# Patient Record
Sex: MALE | Race: WHITE | NOT HISPANIC OR LATINO | Employment: OTHER | URBAN - METROPOLITAN AREA
[De-identification: names, ages, dates, MRNs, and addresses within clinical notes are randomized per-mention and may not be internally consistent; named-entity substitution may affect disease eponyms.]

---

## 2017-03-29 ENCOUNTER — OFFICE VISIT (OUTPATIENT)
Dept: CARDIOLOGY | Facility: CLINIC | Age: 67
End: 2017-03-29

## 2017-03-29 VITALS
WEIGHT: 209 LBS | SYSTOLIC BLOOD PRESSURE: 140 MMHG | BODY MASS INDEX: 28.31 KG/M2 | DIASTOLIC BLOOD PRESSURE: 70 MMHG | HEIGHT: 72 IN | HEART RATE: 71 BPM

## 2017-03-29 DIAGNOSIS — I10 ESSENTIAL HYPERTENSION: ICD-10-CM

## 2017-03-29 DIAGNOSIS — I48.3 TYPICAL ATRIAL FLUTTER (HCC): Primary | ICD-10-CM

## 2017-03-29 PROCEDURE — 93000 ELECTROCARDIOGRAM COMPLETE: CPT | Performed by: INTERNAL MEDICINE

## 2017-03-29 PROCEDURE — 99213 OFFICE O/P EST LOW 20 MIN: CPT | Performed by: INTERNAL MEDICINE

## 2017-03-29 NOTE — PROGRESS NOTES
Sharif A Younger  1950  [phone]      03/29/2017    [facility] CARDIOLOGY     Richard Concepcion MD  [address] [address]    Chief Complaint   Patient presents with   • Atrial Fibrillation        PROBLEM LIST:   1. Atrial flutter:  a. CHADS VASc score = 1, on Pradaxa.  b. Initial diagnosis 2009 or 2010. Patient denies antiarrhythmic drug therapy or cardioversion.  c. EP study with radiofrequency ablation of right atrial isthmus dependent flutter and a PAC rising from the [facility] [place], 10/08/2015.   2. Possible TIA  a. [facility]:   b. DASIA: PFO and VETO  c. MRI of the head: small emboli and mild narrowing of the carotids  d. Pradaxa started  3.   HTN  3. Asthma.  4. Hyperlipidemia.  5. Basal cell carcinoma of the nose.  6. Ischemic evaluation:  a. 09/25/2014 GXT EF 54%. Normal myocardial perfusion study.      Allergies  Allergies   Allergen Reactions   • Azithromycin    • Penicillins        Current Medications    Current Outpatient Prescriptions:   •  albuterol (PROVENTIL HFA;VENTOLIN HFA) 108 (90 BASE) MCG/ACT inhaler, Inhale 2 puffs every 4 (four) hours as needed for wheezing., Disp: , Rfl:   •  budesonide-formoterol (SYMBICORT) 160-4.5 MCG/ACT inhaler, Inhale 2 puffs 2 (two) times a day., Disp: , Rfl:   •  Cetirizine HCl (ZYRTEC ALLERGY PO), Take 20 mg by mouth daily., Disp: , Rfl:   •  Cholecalciferol (VITAMIN D) 2000 UNITS tablet, Take 2,000 Units by mouth daily., Disp: , Rfl:   •  Coenzyme Q10 (CO Q 10 PO), Take 200 mg by mouth daily., Disp: , Rfl:   •  dabigatran etexilate (PRADAXA) 150 MG capsu, Take 150 mg by mouth 2 (two) times a day., Disp: , Rfl:   •  lisinopril (PRINIVIL,ZESTRIL) 5 MG tablet, Take 5 mg by mouth daily., Disp: , Rfl:   •  montelukast (SINGULAIR) 10 MG tablet, Take 10 mg by mouth every night., Disp: , Rfl:   •  Omega-3 Fatty Acids (FISH OIL) 1000 MG capsule capsule, Take 1,000 mg by mouth daily with breakfast., Disp: , Rfl:   •  Saw  "Palmetto, Serenoa repens, 450 MG capsule, Take 450 mg by mouth daily., Disp: , Rfl:   •  simvastatin (ZOCOR) 40 MG tablet, Take 40 mg by mouth every night., Disp: , Rfl:   •  vitamin B-12 (CYANOCOBALAMIN) 2500 MCG sublingual tablet tablet, Place 2,500 mcg under the tongue daily., Disp: , Rfl:     History of Present Illness   HPI    Pt presents for follow up of atrial flutter. Since we last saw the pt, pt denies any AF episodes, SOB, CP, LH, and dizziness. Denies any hospitalizations, ER visits, bleeding, or TIA/CVA symptoms. Overall feels well. BP has been doing well. He had recent labs and brings them in today. His Cr is normal.     ROS:  General:  Denies fatigue, weight gain or loss  Cardiovascular:  Denies CP, PND, syncope, near syncope, edema or palpitations.  Pulmonary:  Denies MARCH, cough, or wheezing      Vitals:    03/29/17 1345   BP: 140/70   BP Location: Left arm   Patient Position: Sitting   Pulse: 71   Weight: 209 lb (94.8 kg)   Height: 72\" (182.9 cm)     PE:  General: NAD  Neck: no JVD, no carotid bruits, no TM  Heart RRR, NL S1, S2, S4 present, no rubs, murmurs  Lungs: CTA, no wheezes, rhonchi, or rales  Abd: soft, non-tender, NL BS  Ext: No musculoskeletal deformities, no edema, cyanosis, or clubbing  Psych: normal mood and affect    Diagnostic Data:    ECG 12 Lead  Date/Time: 3/29/2017 2:30 PM  Performed by: TIGRE MACIAS  Authorized by: TIGRE MACIAS   Comparison: compared with previous ECG from 7/20/2016  Comparison to previous ECG: PAC's resolved  Rhythm: sinus rhythm  BPM: 70  Conduction: incomplete RBBB            1. Typical atrial flutter    2. Essential hypertension          Plan:  1) Atrial Flutter- no recurrences after ablation  Continue present medications.   2) Anticoagulation: CHADSvasc = 4  Continue Pradaxa  3) HTN- controlled  Wt loss, exercise, salt reduction  4) TIA- no further episodes, look for records from [place]    F/jacy Owen and us prn    Scribed for Tigre BARNEY. " MD Marvin by Bety Howell PA-C. 3/29/2017  1:56 PM    I, Oscar Wong MD, personally performed the services face to face as described in this documentation and as scribed by the above named individual in my presence, and it is both accurate and complete.  3/29/2017  2:27 PM

## 2017-07-18 ENCOUNTER — OFFICE VISIT (OUTPATIENT)
Dept: PULMONOLOGY | Facility: CLINIC | Age: 67
End: 2017-07-18

## 2017-07-18 VITALS
SYSTOLIC BLOOD PRESSURE: 148 MMHG | WEIGHT: 205.4 LBS | TEMPERATURE: 98.8 F | RESPIRATION RATE: 16 BRPM | BODY MASS INDEX: 29.41 KG/M2 | HEART RATE: 82 BPM | DIASTOLIC BLOOD PRESSURE: 84 MMHG | HEIGHT: 70 IN | OXYGEN SATURATION: 93 %

## 2017-07-18 DIAGNOSIS — R06.02 SOB (SHORTNESS OF BREATH): Primary | ICD-10-CM

## 2017-07-18 DIAGNOSIS — J44.1 ASTHMA EXACERBATION IN COPD (HCC): ICD-10-CM

## 2017-07-18 DIAGNOSIS — J45.901 ASTHMA EXACERBATION IN COPD (HCC): ICD-10-CM

## 2017-07-18 LAB
BASOPHILS # BLD AUTO: 0 10*3/MM3 (ref 0–0.2)
BASOPHILS NFR BLD AUTO: 0 % (ref 0–1)
DEPRECATED RDW RBC AUTO: 46.8 FL (ref 37–54)
EOSINOPHIL # BLD AUTO: 0 10*3/MM3 (ref 0–0.3)
EOSINOPHIL NFR BLD AUTO: 0 % (ref 0–3)
ERYTHROCYTE [DISTWIDTH] IN BLOOD BY AUTOMATED COUNT: 14 % (ref 11.3–14.5)
HCT VFR BLD AUTO: 42.6 % (ref 38.9–50.9)
HGB BLD-MCNC: 14 G/DL (ref 13.1–17.5)
IMM GRANULOCYTES # BLD: 0.04 10*3/MM3 (ref 0–0.03)
IMM GRANULOCYTES NFR BLD: 0.5 % (ref 0–0.6)
LYMPHOCYTES # BLD AUTO: 0.86 10*3/MM3 (ref 0.6–4.8)
LYMPHOCYTES NFR BLD AUTO: 11.2 % (ref 24–44)
MCH RBC QN AUTO: 30 PG (ref 27–31)
MCHC RBC AUTO-ENTMCNC: 32.9 G/DL (ref 32–36)
MCV RBC AUTO: 91.4 FL (ref 80–99)
MONOCYTES # BLD AUTO: 0.23 10*3/MM3 (ref 0–1)
MONOCYTES NFR BLD AUTO: 3 % (ref 0–12)
NEUTROPHILS # BLD AUTO: 6.56 10*3/MM3 (ref 1.5–8.3)
NEUTROPHILS NFR BLD AUTO: 85.3 % (ref 41–71)
PLATELET # BLD AUTO: 202 10*3/MM3 (ref 150–450)
PMV BLD AUTO: 12.2 FL (ref 6–12)
RBC # BLD AUTO: 4.66 10*6/MM3 (ref 4.2–5.76)
WBC NRBC COR # BLD: 7.69 10*3/MM3 (ref 3.5–10.8)

## 2017-07-18 PROCEDURE — 86606 ASPERGILLUS ANTIBODY: CPT | Performed by: NURSE PRACTITIONER

## 2017-07-18 PROCEDURE — 86671 FUNGUS NES ANTIBODY: CPT | Performed by: NURSE PRACTITIONER

## 2017-07-18 PROCEDURE — 82785 ASSAY OF IGE: CPT | Performed by: NURSE PRACTITIONER

## 2017-07-18 PROCEDURE — 86612 BLASTOMYCES ANTIBODY: CPT | Performed by: NURSE PRACTITIONER

## 2017-07-18 PROCEDURE — 71020 CHG CHEST X-RAY 2 VW: CPT | Performed by: NURSE PRACTITIONER

## 2017-07-18 PROCEDURE — 94726 PLETHYSMOGRAPHY LUNG VOLUMES: CPT | Performed by: NURSE PRACTITIONER

## 2017-07-18 PROCEDURE — 86602 ANTINOMYCES ANTIBODY: CPT | Performed by: NURSE PRACTITIONER

## 2017-07-18 PROCEDURE — 99204 OFFICE O/P NEW MOD 45 MIN: CPT | Performed by: NURSE PRACTITIONER

## 2017-07-18 PROCEDURE — 86698 HISTOPLASMA ANTIBODY: CPT | Performed by: NURSE PRACTITIONER

## 2017-07-18 PROCEDURE — 82787 IGG 1 2 3 OR 4 EACH: CPT | Performed by: NURSE PRACTITIONER

## 2017-07-18 PROCEDURE — 36415 COLL VENOUS BLD VENIPUNCTURE: CPT | Performed by: NURSE PRACTITIONER

## 2017-07-18 PROCEDURE — 86631 CHLAMYDIA ANTIBODY: CPT | Performed by: NURSE PRACTITIONER

## 2017-07-18 PROCEDURE — 94729 DIFFUSING CAPACITY: CPT | Performed by: NURSE PRACTITIONER

## 2017-07-18 PROCEDURE — 86635 COCCIDIOIDES ANTIBODY: CPT | Performed by: NURSE PRACTITIONER

## 2017-07-18 PROCEDURE — 85025 COMPLETE CBC W/AUTO DIFF WBC: CPT | Performed by: NURSE PRACTITIONER

## 2017-07-18 PROCEDURE — 94060 EVALUATION OF WHEEZING: CPT | Performed by: NURSE PRACTITIONER

## 2017-07-18 PROCEDURE — 86609 BACTERIUM ANTIBODY: CPT | Performed by: NURSE PRACTITIONER

## 2017-07-18 RX ORDER — FLUTICASONE PROPIONATE 50 MCG
2 SPRAY, SUSPENSION (ML) NASAL DAILY
COMMUNITY

## 2017-07-18 RX ORDER — CEPHALEXIN 500 MG/1
CAPSULE ORAL
Qty: 42 CAPSULE | Refills: 0 | Status: SHIPPED | OUTPATIENT
Start: 2017-07-18 | End: 2018-04-17

## 2017-07-18 NOTE — PROGRESS NOTES
[facility] new patient chronic bronchitis    CHIEF COMPLAINT    Chronic bronchitis    HISTORY OF PRESENT ILLNESS    Sharif A Younger is a 67 y.o.male here as a referral for his history of bronchitis. He has a history of cough with chronic sputum production that has been worse over the past few months. He had developed a significant cough about a month ago; and was given bactrim and prednisone. The cough is better now; not as severe, but he still has a lot of thick tan sputum.  At the time he was pretty short of breath but that has improved, he has less wheezing than he had before.    He denies a history of hemoptysis, he remains on Pradaxa for his history of atrial flutter.  He denies epistaxis.  He follows along with cardiology and saw Dr. Wong back in March of this year.  He denies palpitations chest pains or edema.    He does have a lot of nasal drainage.  He is on Flonase, Singulair, Zyrtec for this.    He has no fevers chills or night sweats and his weight is been stable.    He has been on Symbicort 160, 2 puffs twice a day.  He is compliant with this.  He also has an albuterol nebulizer machine at home that he uses occasionally.    He reports a history of asthma which in the past was controlled on inhalers but he doesn't really feel like he is very well controlled lately, as every time he has the sinus drainage seems to have trouble getting over the bronchitis.  He denies much in way of dyspnea unless he is actively wheezing or sick, he doesn't have any pleuritic type chest pains.        Patient Active Problem List   Diagnosis   • Atrial flutter   • Asthma   • Hyperlipidemia   • Essential hypertension       Allergies   Allergen Reactions   • Azithromycin    • Penicillins        Current Outpatient Prescriptions:   •  albuterol (PROVENTIL HFA;VENTOLIN HFA) 108 (90 BASE) MCG/ACT inhaler, Inhale 2 puffs every 4 (four) hours as needed for wheezing., Disp: , Rfl:   •  budesonide-formoterol (SYMBICORT)  160-4.5 MCG/ACT inhaler, Inhale 2 puffs 2 (two) times a day., Disp: , Rfl:   •  Cetirizine HCl (ZYRTEC ALLERGY PO), Take 20 mg by mouth daily., Disp: , Rfl:   •  Cholecalciferol (VITAMIN D) 2000 UNITS tablet, Take 2,000 Units by mouth daily., Disp: , Rfl:   •  Coenzyme Q10 (CO Q 10 PO), Take 200 mg by mouth daily., Disp: , Rfl:   •  dabigatran etexilate (PRADAXA) 150 MG capsu, Take 150 mg by mouth 2 (two) times a day., Disp: , Rfl:   •  Flaxseed, Linseed, (FLAXSEED OIL PO), Take 1,000 mg by mouth Daily., Disp: , Rfl:   •  fluticasone (FLONASE) 50 MCG/ACT nasal spray, 2 sprays into each nostril Daily., Disp: , Rfl:   •  lisinopril (PRINIVIL,ZESTRIL) 5 MG tablet, Take 5 mg by mouth daily., Disp: , Rfl:   •  montelukast (SINGULAIR) 10 MG tablet, Take 10 mg by mouth every night., Disp: , Rfl:   •  Omega-3 Fatty Acids (FISH OIL) 1000 MG capsule capsule, Take 1,000 mg by mouth daily with breakfast., Disp: , Rfl:   •  Saw Palmetto, Serenoa repens, 450 MG capsule, Take 450 mg by mouth daily., Disp: , Rfl:   •  simvastatin (ZOCOR) 40 MG tablet, Take 40 mg by mouth every night., Disp: , Rfl:   •  vitamin B-12 (CYANOCOBALAMIN) 2500 MCG sublingual tablet tablet, Place 2,500 mcg under the tongue daily., Disp: , Rfl:   MEDICATION LIST AND ALLERGIES REVIEWED.    Social History   Substance Use Topics   • Smoking status: Former Smoker     Types: Cigarettes     Quit date: 7/20/1943   • Smokeless tobacco: Not on file   • Alcohol use 1.2 - 4.8 oz/week     1 - 4 Glasses of wine, 1 - 4 Cans of beer per week      Comment: occas       FAMILY AND SOCIAL HISTORY REVIEWED.    Review of Systems   Constitutional: Negative for chills, fatigue and fever.   HENT: Positive for congestion and postnasal drip. Negative for nosebleeds and sore throat.    Eyes: Negative.  Negative for pain and redness.   Respiratory: Positive for cough. Negative for apnea, choking, chest tightness, wheezing and stridor.    Cardiovascular: Negative for chest pain,  "palpitations and leg swelling.   Gastrointestinal: Negative for abdominal distention, abdominal pain and nausea.   Endocrine: Negative for cold intolerance and heat intolerance.   Genitourinary: Negative for dysuria, flank pain and hematuria.   Musculoskeletal: Negative for arthralgias and myalgias.   Skin: Negative for color change and rash.   Neurological: Negative for dizziness, syncope and numbness.   Hematological: Negative for adenopathy. Does not bruise/bleed easily.   Psychiatric/Behavioral: Negative for agitation, behavioral problems, confusion and sleep disturbance.   .    /84  Pulse 82  Temp 98.8 °F (37.1 °C)  Resp 16  Ht 70\" (177.8 cm)  Wt 205 lb 6.4 oz (93.2 kg)  SpO2 93%  BMI 29.47 kg/m2  Physical Exam   Constitutional: He is oriented to person, place, and time. Vital signs are normal. He appears well-developed. He is cooperative.  Non-toxic appearance. No distress.   HENT:   Head: Normocephalic. Head is without abrasion and without contusion.   Mouth/Throat: Oropharynx is clear and moist and mucous membranes are normal.   Eyes: Conjunctivae are normal. Pupils are equal, round, and reactive to light.   Neck: Trachea normal and normal range of motion. No JVD present. No tracheal deviation present. No thyroid mass and no thyromegaly present.   Cardiovascular: Normal rate, regular rhythm and normal heart sounds.  Exam reveals no gallop.    No murmur heard.  No edema; cyanosis or calf tenderness. No venous cords.    Pulmonary/Chest: Effort normal. No stridor. No respiratory distress. He has no wheezes. He has no rales.   Abdominal: Soft. He exhibits no ascites. There is no hepatosplenomegaly. There is no tenderness.   Lymphadenopathy:        Head (right side): No submandibular adenopathy present.        Head (left side): No submandibular adenopathy present.     He has no cervical adenopathy.        Right: No supraclavicular adenopathy present.        Left: No supraclavicular adenopathy " present.   Neurological: He is alert and oriented to person, place, and time. He has normal strength.   Skin: Skin is warm and dry. No abrasion and no rash noted.   Psychiatric: He has a normal mood and affect. His speech is normal and behavior is normal. Cognition and memory are normal.       RESULTS    Chest x-ray PA and lateral obtained in the office today, he appears to have somewhat prominent interstitial markings, especially in the bases, cardiomegaly with the left heart border extending most of the chest wall.  No infiltrates or effusions.    PROBLEM LIST    Asthma  --with frequent exacerbations    Sinusitis    Atrial flutter  --Chronically anticoagulated        DISCUSSION    Stop the Bactrim    Instead take the cephalexin 1 pill twice a day with food for 3 weeks, stop if she developed diarrhea    Take 40 mg of prednisone for a total of 4 days, then remain on 20 mg of prednisone for the next 2 weeks    Try to add Spiriva to  Symbicort, use 2 puffs daily, you can use any time of day.     Going to do a CT scan of the chest look for bronchiectasis     He will follow-up in a few weeks after the CT scan of the chest, call us [phone] [id number] if he have any trouble prior to that time.    Clarissa Laird, APRN  07/18/20172:22 PM  Electronically signed     Please note that portions of this note were completed with a voice recognition program. Efforts were made to edit the dictations, but occasionally words are mistranscribed.      CC: Richard Concepcion MD

## 2017-07-24 PROBLEM — J44.1 ASTHMA EXACERBATION IN COPD (HCC): Status: ACTIVE | Noted: 2017-07-24

## 2017-07-24 PROBLEM — J45.901 ASTHMA EXACERBATION IN COPD (HCC): Status: ACTIVE | Noted: 2017-07-24

## 2017-07-24 PROBLEM — R06.02 SOB (SHORTNESS OF BREATH): Status: ACTIVE | Noted: 2017-07-24

## 2017-07-24 LAB
A FLAVUS AB SER QL ID: NEGATIVE
A FUMIGATUS AB SER QL ID: NEGATIVE
A NIGER AB SER QL ID: NEGATIVE
B DERMAT AB TITR SER: NEGATIVE {TITER}
C IMMITIS AB TITR SER ID: NORMAL {TITER}
H CAPSUL AB TITR SER ID: NEGATIVE {TITER}
IGA SERPL-MCNC: 220 MG/DL (ref 61–437)
IGM SERPL-MCNC: 91 MG/DL (ref 20–172)

## 2017-07-26 LAB
A FUMIGATUS1 AB SER QL ID: NEGATIVE
A PULLULANS AB SER QL: NEGATIVE
IGG SERPL-MCNC: 964 MG/DL (ref 700–1600)
IGG1 SER-MCNC: 523 MG/DL (ref 248–810)
IGG2 SER-MCNC: 382 MG/DL (ref 130–555)
IGG3 SER-MCNC: 40 MG/DL (ref 15–102)
IGG4 SER-MCNC: 16 MG/DL (ref 2–96)
LACEYELLA SACCHARI AB SER QL: NEGATIVE
MICROPOLYSPORA FAENI: NEGATIVE
PIGEON SERUM AB QL ID: NEGATIVE
T VULGARIS AB SER QL ID: NEGATIVE
TOTAL IGE SMQN RAST: 14 IU/ML (ref 0–100)

## 2017-08-23 ENCOUNTER — HOSPITAL ENCOUNTER (OUTPATIENT)
Dept: CT IMAGING | Facility: HOSPITAL | Age: 67
Discharge: HOME | End: 2017-08-23
Admitting: NURSE PRACTITIONER

## 2017-08-23 DIAGNOSIS — J44.1 ASTHMA EXACERBATION IN COPD (HCC): ICD-10-CM

## 2017-08-23 DIAGNOSIS — J45.901 ASTHMA EXACERBATION IN COPD (HCC): ICD-10-CM

## 2017-08-23 DIAGNOSIS — R06.02 SOB (SHORTNESS OF BREATH): ICD-10-CM

## 2017-08-23 PROCEDURE — 71250 CT THORAX DX C-: CPT

## 2017-10-04 PROBLEM — J30.2 SEASONAL ALLERGIES: Status: ACTIVE | Noted: 2017-10-04

## 2017-10-04 PROBLEM — R00.1 BRADYCARDIA: Status: ACTIVE | Noted: 2017-10-04

## 2017-10-04 PROBLEM — Z92.29 HX: ANTICOAGULATION: Status: ACTIVE | Noted: 2017-10-04

## 2017-10-04 PROBLEM — C44.311 BASAL CELL CARCINOMA OF NOSE: Status: ACTIVE | Noted: 2017-10-04

## 2017-10-10 ENCOUNTER — OFFICE VISIT (OUTPATIENT)
Dept: PULMONOLOGY | Facility: CLINIC | Age: 67
End: 2017-10-10

## 2017-10-10 VITALS
HEIGHT: 70 IN | OXYGEN SATURATION: 91 % | HEART RATE: 72 BPM | TEMPERATURE: 97.7 F | WEIGHT: 208.6 LBS | SYSTOLIC BLOOD PRESSURE: 125 MMHG | DIASTOLIC BLOOD PRESSURE: 80 MMHG | BODY MASS INDEX: 29.86 KG/M2 | RESPIRATION RATE: 16 BRPM

## 2017-10-10 DIAGNOSIS — Z92.29 HX: ANTICOAGULATION: ICD-10-CM

## 2017-10-10 DIAGNOSIS — J44.1 ASTHMA EXACERBATION IN COPD (HCC): ICD-10-CM

## 2017-10-10 DIAGNOSIS — R06.02 SOB (SHORTNESS OF BREATH): Primary | ICD-10-CM

## 2017-10-10 DIAGNOSIS — J30.2 CHRONIC SEASONAL ALLERGIC RHINITIS DUE TO OTHER ALLERGEN: ICD-10-CM

## 2017-10-10 DIAGNOSIS — J45.901 ASTHMA EXACERBATION IN COPD (HCC): ICD-10-CM

## 2017-10-10 PROCEDURE — 99214 OFFICE O/P EST MOD 30 MIN: CPT | Performed by: NURSE PRACTITIONER

## 2017-10-10 RX ORDER — DOXYCYCLINE HYCLATE 100 MG/1
100 CAPSULE ORAL 2 TIMES DAILY
Qty: 20 CAPSULE | Refills: 0 | COMMUNITY
Start: 2017-10-10 | End: 2017-10-20

## 2017-10-10 RX ORDER — PREDNISONE 20 MG/1
TABLET ORAL
Qty: 15 TABLET | Refills: 0 | Status: SHIPPED | OUTPATIENT
Start: 2017-10-10

## 2017-10-10 NOTE — PROGRESS NOTES
[facility] Pulmonary Follow up    CHIEF COMPLAINT    Follow-up asthma, moderate obstruction, nasal drainage    HISTORY OF PRESENT ILLNESS    Sharif A Younger is a 67 y.o.male here today for follow-up.  I saw him as a new patient in July for history of persistent cough and sputum.  I added Spiriva, gave him a course of prednisone and cephalexin for 3 weeks, and he is here for follow-up.    He has a history of very minimal smoking for just 5 years before he stopped completely in 1943.     When I last saw him he was status post a course of Bactrim and prednisone but had finished it still had a lot of thick tan sputum, dyspnea with wheezing and nasal drainage.  I gave him 3 weeks of Ceftin air, a prednisone taper, and he felt significantly better until last Tuesday.  At that time he was doing yard work and there was a lot of dust, he noticed an increase in wheezing and now has had some coughing with tan sputum over the past few days.  He does not however feel as short of breath as he usually does when he has an exacerbation and he is attributing this to the Spiriva.  [facility] covered Spiriva hand-held for him and he is using one puff daily.  He thinks it works just as well as the respimat.  He also remains on the Symbicort 160, 2 puffs twice a day.    He denies a history of hemoptysis, he remains on Pradaxa for his history of atrial flutter.  He denies epistaxis.  He follows along with cardiology and saw Dr. Wong back in March of this year.  He denies palpitations chest pains or edema.    He remains on Flonase, Singulair, Zyrtec, there is no epistaxis.    He has no fevers chills or night sweats and his weight is been stable.    He has been using the albuterol nebulizer machine more over the past few days and that helps him expectorate as well as helps with the wheezing and coughing.    Patient Active Problem List   Diagnosis   • Atrial flutter   • Asthma   • Hyperlipidemia   • Essential hypertension   • Asthma  exacerbation in COPD   • SOB (shortness of breath)   • Seasonal allergies   • Basal cell carcinoma of nose   • HX: anticoagulation   • Bradycardia       Allergies   Allergen Reactions   • Azithromycin    • Penicillins        Current Outpatient Prescriptions:   •  albuterol (PROVENTIL HFA;VENTOLIN HFA) 108 (90 BASE) MCG/ACT inhaler, Inhale 2 puffs every 4 (four) hours as needed for wheezing., Disp: , Rfl:   •  budesonide-formoterol (SYMBICORT) 160-4.5 MCG/ACT inhaler, Inhale 2 puffs 2 (two) times a day., Disp: , Rfl:   •  cephalexin (KEFLEX) 500 MG capsule, Take 1 pill twice daily with food, Disp: 42 capsule, Rfl: 0  •  Cetirizine HCl (ZYRTEC ALLERGY PO), Take 20 mg by mouth daily., Disp: , Rfl:   •  Cholecalciferol (VITAMIN D) 2000 UNITS tablet, Take 2,000 Units by mouth daily., Disp: , Rfl:   •  Coenzyme Q10 (CO Q 10 PO), Take 200 mg by mouth daily., Disp: , Rfl:   •  dabigatran etexilate (PRADAXA) 150 MG capsu, Take 150 mg by mouth 2 (two) times a day., Disp: , Rfl:   •  Flaxseed, Linseed, (FLAXSEED OIL PO), Take 1,000 mg by mouth Daily., Disp: , Rfl:   •  fluticasone (FLONASE) 50 MCG/ACT nasal spray, 2 sprays into each nostril Daily., Disp: , Rfl:   •  lisinopril (PRINIVIL,ZESTRIL) 5 MG tablet, Take 5 mg by mouth daily., Disp: , Rfl:   •  montelukast (SINGULAIR) 10 MG tablet, Take 10 mg by mouth every night., Disp: , Rfl:   •  Omega-3 Fatty Acids (FISH OIL) 1000 MG capsule capsule, Take 1,000 mg by mouth daily with breakfast., Disp: , Rfl:   •  Saw Palmetto, Serenoa repens, 450 MG capsule, Take 450 mg by mouth daily., Disp: , Rfl:   •  simvastatin (ZOCOR) 40 MG tablet, Take 40 mg by mouth every night., Disp: , Rfl:   •  vitamin B-12 (CYANOCOBALAMIN) 2500 MCG sublingual tablet tablet, Place 2,500 mcg under the tongue daily., Disp: , Rfl:   •  doxycycline (VIBRAMYCIN) 100 MG capsule, Take 1 capsule by mouth 2 (Two) Times a Day for 10 days., Disp: 20 capsule, Rfl: 0  •  predniSONE (DELTASONE) 20 MG tablet, Take 2  "pills daily x 5 days; then 1 daily x 5 days, Disp: 15 tablet, Rfl: 0  MEDICATION LIST AND ALLERGIES REVIEWED.    Social History   Substance Use Topics   • Smoking status: Former Smoker     Types: Cigarettes     Quit date: 7/20/1943   • Smokeless tobacco: Not on file   • Alcohol use 1.2 - 4.8 oz/week     1 - 4 Glasses of wine, 1 - 4 Cans of beer per week      Comment: occas       FAMILY AND SOCIAL HISTORY REVIEWED.    Review of Systems   Constitutional: Negative for activity change, chills, fatigue and fever.   HENT: Positive for postnasal drip. Negative for hearing loss, nosebleeds and sneezing.    Respiratory: Positive for cough, shortness of breath and wheezing. Negative for apnea, chest tightness and stridor.    Cardiovascular: Negative for chest pain, palpitations and leg swelling.        NO INCREASE IN MILD BLE EDEMA; NO CALF TENDERNESS    Gastrointestinal: Negative for abdominal pain, diarrhea and nausea.   Neurological: Negative for dizziness, syncope and light-headedness.   .    /80  Pulse 72  Temp 97.7 °F (36.5 °C)  Resp 16  Ht 70\" (177.8 cm)  Wt 208 lb 9.6 oz (94.6 kg)  SpO2 91% Comment: RA  BMI 29.93 kg/m2  Physical Exam   Constitutional: He is oriented to person, place, and time. He appears well-developed. No distress.   HENT:   Head: Normocephalic.   Eyes: Pupils are equal, round, and reactive to light.   Neck: No JVD present. No thyromegaly present.   Cardiovascular: Normal rate, regular rhythm and normal heart sounds.  Exam reveals no friction rub.    Mild BLE edema; no venous cords or calf tenderness.    Pulmonary/Chest: Effort normal. No stridor. No respiratory distress. He has no wheezes. He has no rales. He exhibits no tenderness.   He had a little bit of left upper lobe wheezing initially, no rhonchi or Rales   Lymphadenopathy:     He has no cervical adenopathy.   Neurological: He is alert and oriented to person, place, and time.   Skin: He is not diaphoretic.   Psychiatric: He has a " normal mood and affect. His behavior is normal. Thought content normal.       RESULTS    Quantitative immunoglobulin levels within normal limits as well as IgE  Fungal serologies and hypersensitivity pneumonitis panel negative  Eosinophil counts normal    HRCT the chest as noted below:  IMPRESSION:  Minimal bronchiectasis at the bases with peribronchial cuffing. Mild septal thickening at both lung bases with minimal  postinflammatory stranding at the lung bases.       Otherwise, obstructive changes is seen in the upper lung zones, however, the mid and upper lung zones are clear, and there is no ground glass disease, consolidation, free fluid or perimeter secondary pulmonary lobular fibrotic abnormalities.      DICTATED:     08/23/2017  This report was finalized on 8/23/2017 6:41 PM by Dr. Reji Clifton MD.          PROBLEM LIST    Problem List Items Addressed This Visit        Respiratory    Asthma exacerbation in COPD    SOB (shortness of breath) - Primary    Seasonal allergies       Other    HX: anticoagulation    Overview     1. Anticoagulation.  CHADS-VASc score of 1.  We had a long discussion with him about the pros and cons of staying on Pradaxa.  He would like to come off Pradaxa.  We will give him aspirin 325 mg daily.  He is to check his pulse and notify us if it becomes irregular or if he develops any atrial fibrillation.                     DISCUSSION    For the tan sputum and increasing chest tightness, will give him 10 days of doxycycline in 10 days of prednisone, he was told to try to get his flu shot in a few days, as he and his wife are going to get it in 3 days.  Even though he has a little more sputum above his baseline, I think he'll be fine    Remain on current inhalation therapy with the Symbicort and Spiriva    He had his Pneumovax 23 and 2013, and his Prevnar 13 and 2016, he was 63 when he received the Pneumovax    Remain on the Singulair, Flonase and Zyrtec    He scan of the chest was  reviewed    Follow-up in 6 months with spirometry    The patient was told to call and given extensions 132, 112, 104 if needed for an earlier visit if problems arise prior to the scheduled followup.      Clarissa Laird, KWABENA  10/10/21560:20 PM  Electronically signed     Please note that portions of this note were completed with a voice recognition program. Efforts were made to edit the dictations, but occasionally words are mistranscribed.      CC: Richard Concepcion MD

## 2018-04-17 ENCOUNTER — OFFICE VISIT (OUTPATIENT)
Dept: PULMONOLOGY | Facility: CLINIC | Age: 68
End: 2018-04-17

## 2018-04-17 VITALS
TEMPERATURE: 98.4 F | HEIGHT: 72 IN | BODY MASS INDEX: 28.31 KG/M2 | DIASTOLIC BLOOD PRESSURE: 72 MMHG | WEIGHT: 209 LBS | OXYGEN SATURATION: 92 % | HEART RATE: 70 BPM | SYSTOLIC BLOOD PRESSURE: 118 MMHG

## 2018-04-17 DIAGNOSIS — J30.1 ACUTE SEASONAL ALLERGIC RHINITIS DUE TO POLLEN: ICD-10-CM

## 2018-04-17 DIAGNOSIS — R05.9 COUGH: Primary | ICD-10-CM

## 2018-04-17 DIAGNOSIS — J45.20 MILD INTERMITTENT ASTHMA WITHOUT COMPLICATION: ICD-10-CM

## 2018-04-17 DIAGNOSIS — Z87.891 FORMER SMOKER: ICD-10-CM

## 2018-04-17 PROCEDURE — 94375 RESPIRATORY FLOW VOLUME LOOP: CPT | Performed by: NURSE PRACTITIONER

## 2018-04-17 PROCEDURE — 99214 OFFICE O/P EST MOD 30 MIN: CPT | Performed by: NURSE PRACTITIONER

## 2018-04-17 RX ORDER — CITALOPRAM 20 MG/1
20 TABLET ORAL DAILY
Refills: 3 | COMMUNITY
Start: 2018-03-19

## 2018-04-17 NOTE — PROGRESS NOTES
[facility] Pulmonary Follow up    CHIEF COMPLAINT    Follow-up asthma, moderate obstruction, nasal drainage    HISTORY OF OF PRESENT ILLNESS    Sharif A Younger is a 68 y.o.male here today for FU. He has a history of very minimal smoking for just 5 years before he stopped completely in 1943. I saw him as a 2nd opinion in 2017 for asthma.     I saw him last October 2017.  Since then he states he's had an exacerbation about a time, requiring prednisone and antibiotics.  He took cephalexin with prednisone November 2017, doxycycline with prednisone December, February he took some type of antibiotic as well as prednisone, and twice in March this year doxycycline and prednisone, I don't know the second antibiotic for the second exacerbation.    January he had Bactrim.  He does have a history of immunotherapy with allergy shots in the past.  He's had sinus surgery with polyps removed twice in the past, 1998 most recent thousand 8.  He has significant sinus drainage, sinus pressure, no epistaxis.    I added Spiriva at one point, to the ICS/LAD, he was on.  Initially he felt like he was doing better on that but now thinks is making his breathing much worse.    He denies a history of hemoptysis, he remains on Pradaxa for his history of atrial flutter.  He denies epistaxis.  He follows along with cardiology.  He denies palpitations chest pains or edema.  No hemoptysis currently.    He remains on Flonase, Singulair, Zyrtec, there is no epistaxis.  As above he has persistent symptoms despite therapy.    He has no fevers chills or night sweats and his weight is been stable.    He has been using the albuterol nebulizer machine more over the past few days and that helps him expectorate as well as helps with the wheezing and coughing.  He uses it 4-5 times a day.    He also had significant symptoms of dysphagia, he's been seen by an allergist from what I can tell, I think this is the same allergy/asthma physician he saw in the past;   Carlitos Pineda; who apparently he sees again for allergy testing in a few days.  By his report when he was able to see this physician in the past as asthma was under very good control.  When he moved he was unable to follow along with him in the office.    He remains on Symbicort 160, 2 puffs twice a day.  Spiriva respimat 2 puffs daily, he does use albuterol either HFA or nebulizer and this helps for a good 2 hours.    By his report his exacerbations always start out with increasing nasal drainage.  Also schedule a 92 start the office.        Patient Active Problem List   Diagnosis   • Atrial flutter   • Asthma   • Hyperlipidemia   • Essential hypertension   • Asthma exacerbation in COPD   • SOB (shortness of breath)   • Seasonal allergies   • Basal cell carcinoma of nose   • HX: anticoagulation   • Bradycardia   • Former smoker       Allergies   Allergen Reactions   • Azithromycin    • Penicillins        Current Outpatient Prescriptions:   •  albuterol (PROVENTIL HFA;VENTOLIN HFA) 108 (90 BASE) MCG/ACT inhaler, Inhale 2 puffs every 4 (four) hours as needed for wheezing., Disp: , Rfl:   •  budesonide-formoterol (SYMBICORT) 160-4.5 MCG/ACT inhaler, Inhale 2 puffs 2 (two) times a day., Disp: , Rfl:   •  Cetirizine HCl (ZYRTEC ALLERGY PO), Take 20 mg by mouth daily., Disp: , Rfl:   •  Cholecalciferol (VITAMIN D) 2000 UNITS tablet, Take 2,000 Units by mouth daily., Disp: , Rfl:   •  citalopram (CeleXA) 20 MG tablet, Take 20 mg by mouth Daily., Disp: , Rfl: 3  •  Coenzyme Q10 (CO Q 10 PO), Take 200 mg by mouth daily., Disp: , Rfl:   •  dabigatran etexilate (PRADAXA) 150 MG capsu, Take 150 mg by mouth 2 (two) times a day., Disp: , Rfl:   •  Flaxseed, Linseed, (FLAXSEED OIL PO), Take 1,000 mg by mouth Daily., Disp: , Rfl:   •  fluticasone (FLONASE) 50 MCG/ACT nasal spray, 2 sprays into each nostril Daily., Disp: , Rfl:   •  lisinopril (PRINIVIL,ZESTRIL) 5 MG tablet, Take 5 mg by mouth daily., Disp: , Rfl:   •  montelukast  "(SINGULAIR) 10 MG tablet, Take 10 mg by mouth every night., Disp: , Rfl:   •  Omega-3 Fatty Acids (FISH OIL) 1000 MG capsule capsule, Take 1,000 mg by mouth daily with breakfast., Disp: , Rfl:   •  Saw Palmetto, Serenoa repens, 450 MG capsule, Take 450 mg by mouth daily., Disp: , Rfl:   •  simvastatin (ZOCOR) 40 MG tablet, Take 40 mg by mouth every night., Disp: , Rfl:   •  vitamin B-12 (CYANOCOBALAMIN) 2500 MCG sublingual tablet tablet, Place 2,500 mcg under the tongue daily., Disp: , Rfl:   •  predniSONE (DELTASONE) 20 MG tablet, Take 2 pills daily x 5 days; then 1 daily x 5 days, Disp: 15 tablet, Rfl: 0  MEDICATION LIST AND ALLERGIES REVIEWED.    Social History   Substance Use Topics   • Smoking status: Former Smoker     Types: Cigarettes     Quit date: 7/20/1943   • Smokeless tobacco: Not on file   • Alcohol use 1.2 - 4.8 oz/week     1 - 4 Glasses of wine, 1 - 4 Cans of beer per week      Comment: occas       FAMILY AND SOCIAL HISTORY REVIEWED.    Review of Systems   Constitutional: Negative for activity change, chills, fatigue and fever.   HENT: Positive for postnasal drip. Negative for hearing loss, nosebleeds and sneezing.    Respiratory: Negative for apnea, chest tightness, wheezing and stridor.    Cardiovascular: Negative for chest pain and palpitations.        NO INCREASE IN MILD BLE EDEMA; NO CALF TENDERNESS    Gastrointestinal: Negative for abdominal pain, diarrhea and nausea.   Neurological: Negative for dizziness, syncope and light-headedness.   .    /72 (BP Location: Right arm, Patient Position: Sitting, Cuff Size: Adult)   Pulse 70   Temp 98.4 °F (36.9 °C)   Ht 182.9 cm (72\")   Wt 94.8 kg (209 lb)   SpO2 92%   BMI 28.35 kg/m²   Physical Exam   Constitutional: He is oriented to person, place, and time. He appears well-developed. No distress.   HENT:   Head: Normocephalic.   Eyes: Pupils are equal, round, and reactive to light.   Neck: No JVD present. No thyromegaly present. "   Cardiovascular: Normal rate, regular rhythm and normal heart sounds.  Exam reveals no friction rub.    Mild BLE edema; no venous cords or calf tenderness.    Pulmonary/Chest: Effort normal. No stridor. No respiratory distress. He has rales. He exhibits no tenderness.   Lymphadenopathy:     He has no cervical adenopathy.   Neurological: He is alert and oriented to person, place, and time.   Skin: He is not diaphoretic.   Psychiatric: He has a normal mood and affect. His behavior is normal. Thought content normal.       RESULTS    Spirometry reveals no obstruction, improvement in FEV1, today 2.44 L, 75%, ratio no longer below 70, indicating now no obstruction at 72%.  Minute ventilation normal.    PROBLEM LIST      ICD-10-CM ICD-9-CM   1. Cough R05 786.2   2. Acute seasonal allergic rhinitis due to pollen J30.1 477.0   3. Mild intermittent asthma without complication J45.20 493.90   4. Former smoker Z87.891 V15.82       DISCUSSION    I did suggest a CT the sinuses as long with an upper GI to see if chronic sinusitis or silent reflux is contributing to his frequent exacerbations; hopefully he will qualify for Xolair through allergy testing on his skin, however as noted below none of her testing qualified him for injections to control the asthma.  Immunoglobulin levels were also unremarkable as was fungal serologies etc. HRCT was neg.     He is currently recovering and status post antibiotics and prednisone from his most recent exacerbation.  We have tested immunoglobulin levels in the past, IgE, RAST panel etc., eosinophil levels.  Based on those lab results he is not a candidate for Xolair or Nucalla.     We did discuss however, that in our office here only able to test serologic reaction as far as the Rast panel, and that her more mast cells on the skin which make it likely tonie he could have a very positive allergy panel of he was given skin testing by an allergist which of course as above is pending.     We talked  about the fact it would be better for him to only follow along with one pulmonary doctor, if he is only going to get allergies addressed at the allergy asthma office in [place] that's fine, in that case we can just manage his asthma here.  However if he has spirometry and asthma managed by the allergy asthma office in [place] where he will be getting the allergy testing, he does not need to follow-up here because it's unnecessary to have to pulmonologist at once.    However we will see him at this point on an as-needed basis depending on what his allergy asthma physician thinks, Dr. Pineda.  So for now he'll follow-up on an as-needed basis when he calls.  We did review all of his old records and I gave him his many copies as I could today to bring to Dr. Pineda; try to give him the previous HRCT, lab work as well as spirometry.  Despite his recent exacerbation spirometry is improved today, probably because he status post the prednione    For now we'll have him his wife call, there were given our phone number to the office as well as extension [phone] if they need to be seen for any reason.  In the meantime continue on current inhalation therapy.        Pulmonary  New discharge [employer] insurance card,  04/17/201811:20 AM  Electronically signed     Please note that portions of this note were completed with a voice recognition program. Efforts were made to edit the dictations, but occasionally words are mistranscribed.      CC: Richard Concepcion MD

## 2018-04-23 PROBLEM — Z87.891 FORMER SMOKER: Status: ACTIVE | Noted: 2018-04-23
